# Patient Record
Sex: MALE
[De-identification: names, ages, dates, MRNs, and addresses within clinical notes are randomized per-mention and may not be internally consistent; named-entity substitution may affect disease eponyms.]

---

## 2023-10-23 ENCOUNTER — APPOINTMENT (OUTPATIENT)
Dept: ORTHOPEDIC SURGERY | Facility: CLINIC | Age: 28
End: 2023-10-23
Payer: MEDICAID

## 2023-10-23 VITALS — WEIGHT: 205.03 LBS | BODY MASS INDEX: 28.7 KG/M2 | HEIGHT: 70.87 IN

## 2023-10-23 DIAGNOSIS — M25.511 PAIN IN RIGHT SHOULDER: ICD-10-CM

## 2023-10-23 PROBLEM — Z00.00 ENCOUNTER FOR PREVENTIVE HEALTH EXAMINATION: Status: ACTIVE | Noted: 2023-10-23

## 2023-10-23 PROCEDURE — 73030 X-RAY EXAM OF SHOULDER: CPT | Mod: RT

## 2023-10-23 PROCEDURE — 20611 DRAIN/INJ JOINT/BURSA W/US: CPT | Mod: RT

## 2023-10-23 PROCEDURE — 99204 OFFICE O/P NEW MOD 45 MIN: CPT | Mod: 25

## 2024-01-22 ENCOUNTER — APPOINTMENT (OUTPATIENT)
Dept: ORTHOPEDIC SURGERY | Facility: CLINIC | Age: 29
End: 2024-01-22

## 2025-05-19 ENCOUNTER — EMERGENCY (EMERGENCY)
Facility: HOSPITAL | Age: 30
LOS: 0 days | Discharge: HOME | End: 2025-05-19
Attending: STUDENT IN AN ORGANIZED HEALTH CARE EDUCATION/TRAINING PROGRAM
Payer: MEDICAID

## 2025-05-19 VITALS
DIASTOLIC BLOOD PRESSURE: 73 MMHG | OXYGEN SATURATION: 100 % | TEMPERATURE: 98 F | HEART RATE: 84 BPM | WEIGHT: 196.87 LBS | RESPIRATION RATE: 18 BRPM | SYSTOLIC BLOOD PRESSURE: 114 MMHG

## 2025-05-19 PROCEDURE — 99283 EMERGENCY DEPT VISIT LOW MDM: CPT

## 2025-05-19 RX ORDER — ACETAMINOPHEN 500 MG/5ML
650 LIQUID (ML) ORAL ONCE
Refills: 0 | Status: COMPLETED | OUTPATIENT
Start: 2025-05-19 | End: 2025-05-19

## 2025-05-19 RX ORDER — AMOXICILLIN 500 MG/1
1 CAPSULE ORAL
Qty: 30 | Refills: 0
Start: 2025-05-19 | End: 2025-05-28

## 2025-05-19 RX ADMIN — Medication 650 MILLIGRAM(S): at 15:16

## 2025-05-19 NOTE — ED PROVIDER NOTE - NSFOLLOWUPCLINICS_GEN_ALL_ED_FT
[facility]  Dental  [address] [place] [id number]  Phone: [phone]  Fax:   Follow Up Time: 1-3 Days

## 2025-05-19 NOTE — ED PROVIDER NOTE - CLINICAL SUMMARY MEDICAL DECISION MAKING FREE TEXT BOX
30 year old male presents here c.o left upper tooth pain x 1 month which acutely worsened last night. Has been taking naproxen with minimal relief. [facility] return precautions provided.

## 2025-05-19 NOTE — ED ADULT NURSE NOTE - NSFALLUNIVINTERV_ED_ALL_ED
Bed/Stretcher in lowest position, wheels locked, appropriate side rails in place/Call bell, personal items and telephone in reach/Instruct patient to call for assistance before getting out of bed/chair/stretcher/Non-slip footwear applied when patient is off stretcher/Olds to call system/Physically safe environment - no spills, clutter or unnecessary equipment/Purposeful proactive rounding/Room/bathroom lighting operational, light cord in reach

## 2025-05-19 NOTE — ED PROVIDER NOTE - NSFOLLOWUPINSTRUCTIONS_ED_ALL_ED_FT
Our Emergency Department Referral Coordinators will be reaching out to you in the next 24-48 hours from 9:00am to 5:00pm to schedule a follow up appointment. Please expect a phone call from the hospital in that time frame. If you do not receive a call or if you have any questions or concerns, you can reach them at   [phone].    Dental Pain: What It Means  A close-up view of two teeth. One tooth is normal, and the other has tooth decay and an abscess.  Dental pain is often a sign that something is wrong with your teeth or gums. You can also have pain after a dental treatment. If you have dental pain, it's important to contact your dental care provider, especially if you don't know what's causing the pain.    Dental pain may hurt a lot or a little. It can be caused by many things, including:  Tooth decay. This is also called cavities or caries.  Infection.  An abscess. This is when the inner part of the tooth is filled with pus.  An injury or a crack in the tooth.  Gum disease or gums that move back and expose the root of a tooth.  Abnormal grinding or clenching of teeth.  Not taking good care of your teeth.  Sometimes the cause of pain isn't known.    You may have pain all the time, or it may come and go. It may happen only when you're:  Chewing.  Exposed to hot or cold temperatures.  Eating or drinking foods or drinks with a lot of sugar in them, such as soda or candy.  Follow these instructions at home:  Medicines    Take your medicines only as told.  If you were given antibiotics, take them as told. Do not stop taking them even if you start to feel better.  Eating and drinking    Avoid foods or drinks that cause you pain. These may include:  Very hot or very cold foods or drinks.  Sweet or sugary foods or drinks.  Managing pain and swelling    Bag of ice on a towel on the skin.  Use ice or an ice pack on the painful area of your face as told.  Put ice in a plastic bag.  Place a towel between your skin and the ice.  Leave the ice on for 20 minutes, 2–3 times a day.  Remove the ice if your skin turns bright red. This is very important. If you cannot feel pain, heat, or cold, you have a greater risk of damage to the area.  If your skin turns red, take off the ice right away to prevent skin damage. The risk of damage is higher if you can't feel pain, heat, or cold.  Brushing and flossing    Brush your teeth twice a day using a fluoride toothpaste.  Use a toothpaste made for sensitive teeth as told by your dental care provider.  Always use a soft toothbrush. This will help prevent irritation of your gums.  Floss your teeth at least once a day.  General instructions    Do not apply heat to the outside of your face.  To cleanse your mouth and help with any irritation and swelling in the painful area, you can try rinsing with salt water.  Swish and gargle with salt water and then spit it out. To make salt water, add a half to a whole spoonful of salt to a glass of warm water. Mix well.  You can repeat this every 2–3 hours for pain.  Contact a dental care provider if:  You have dental pain and you don't know why.  Your pain isn't controlled with medicines.  Your symptoms get worse.  You have new symptoms.  Get help right away if:  You can't open your mouth.  You're having trouble breathing or swallowing.  You have a fever.  Your face, neck, or jaw is swollen.  These symptoms may be an emergency. Call 911 right away.  Do not wait to see if the symptoms will go away.  Do not drive yourself to the hospital.  This information is not intended to replace advice given to you by your health care provider. Make sure you discuss any questions you have with your health care provider.

## 2025-05-19 NOTE — ED PROVIDER NOTE - PHYSICAL EXAMINATION
CONSTITUTIONAL: WA / WN / NAD  HEAD: NCAT  EYES: PERRL; EOMI;   ENT: Normal pharynx; mucous membranes pink/moist, no erythema.airway patent + ttp tooth #16 no apical abscess   NECK: Supple;   SKIN: Warm and dry;   NEURO: AAOx3  PSYCH: Memory Intact, Normal Affect

## 2025-05-19 NOTE — ED PROVIDER NOTE - PATIENT PORTAL LINK FT
You can access the FollowMyHealth Patient Portal offered by [facility] by registering at the following website: [email or web address]. By joining [employer]’s FollowMyHealth portal, you will also be able to view your health information using other applications (apps) compatible with our system.

## 2025-05-19 NOTE — ED PROVIDER NOTE - OBJECTIVE STATEMENT
30 year old male presents here c.o left upper tooth pain x 1 month which acutely worsened last night. Has been taking naproxen with minimal relief.

## 2025-05-21 ENCOUNTER — EMERGENCY (EMERGENCY)
Facility: HOSPITAL | Age: 30
LOS: 0 days | Discharge: HOME | End: 2025-05-21
Attending: STUDENT IN AN ORGANIZED HEALTH CARE EDUCATION/TRAINING PROGRAM
Payer: MEDICAID

## 2025-05-21 VITALS
HEART RATE: 177 BPM | TEMPERATURE: 98 F | DIASTOLIC BLOOD PRESSURE: 70 MMHG | WEIGHT: 189.6 LBS | SYSTOLIC BLOOD PRESSURE: 100 MMHG | HEIGHT: 68 IN | RESPIRATION RATE: 18 BRPM | OXYGEN SATURATION: 100 %

## 2025-05-21 VITALS — HEART RATE: 65 BPM

## 2025-05-21 PROCEDURE — 99283 EMERGENCY DEPT VISIT LOW MDM: CPT

## 2025-05-21 PROCEDURE — 99284 EMERGENCY DEPT VISIT MOD MDM: CPT

## 2025-05-21 RX ORDER — ACETAMINOPHEN 500 MG/5ML
650 LIQUID (ML) ORAL ONCE
Refills: 0 | Status: COMPLETED | OUTPATIENT
Start: 2025-05-21 | End: 2025-05-21

## 2025-05-21 RX ORDER — IBUPROFEN 200 MG
600 TABLET ORAL ONCE
Refills: 0 | Status: COMPLETED | OUTPATIENT
Start: 2025-05-21 | End: 2025-05-21

## 2025-05-21 RX ORDER — ACETAMINOPHEN 500 MG/5ML
2 LIQUID (ML) ORAL
Qty: 40 | Refills: 0
Start: 2025-05-21

## 2025-05-21 RX ORDER — IBUPROFEN 200 MG
1 TABLET ORAL
Qty: 20 | Refills: 0
Start: 2025-05-21

## 2025-05-21 RX ADMIN — Medication 600 MILLIGRAM(S): at 11:05

## 2025-05-21 RX ADMIN — Medication 650 MILLIGRAM(S): at 11:05

## 2025-05-21 NOTE — ED PROVIDER NOTE - CLINICAL SUMMARY MEDICAL DECISION MAKING FREE TEXT BOX
Pt here with pain to tooth #15, likely dental infection vs caries. Vitals wnl in ED. No airway compromise. No e/o drainable abscess or amanda angina on exam. Already on abx and will f/u with dental tomorrow. Given pain meds here and sent motrin and tylenol to pharmacy.

## 2025-05-21 NOTE — ED PROVIDER NOTE - DIFFERENTIAL DIAGNOSIS
Differential Diagnosis differential dx includes but is not limited to:  dental infection vs caries. less likely drainable abscess or amanda angina

## 2025-05-21 NOTE — ED PROVIDER NOTE - PATIENT PORTAL LINK FT
You can access the FollowMyHealth Patient Portal offered by [facility] by registering at the following website: [email or web address]. By joining [employer]’s FollowMyHealth portal, you will also be able to view your health information using other applications (apps) compatible with our system.

## 2025-05-21 NOTE — ED PROVIDER NOTE - OBJECTIVE STATEMENT
30-year-old male with no pertinent past medical history presents complaining of left upper dental pain for the past 4 days. pt denies any other symptoms including fevers, chill, headache, recent illness/travel, cough, abdominal pain, chest pain, or SOB.

## 2025-05-21 NOTE — ED PROVIDER NOTE - NSFOLLOWUPINSTRUCTIONS_ED_ALL_ED_FT
You visited the Emergency Department for a dental issue. We recommend that you follow up with a dentist. If you have a private dentist, please contact them directly. If you do not have a dentist, we have a dental clinic that will do its best to accommodate you. To make an appointment, please call the dental clinic at [phone] and leave a voicemail or email them at [email or web address] for a response.  ---------------------  Dental Pain    Dental pain (toothache) may be caused by many things including tooth decay (cavities or caries), abscess or infection, or trauma. If you were prescribed an antibiotic medicine, finish all of it even if you start to feel better. Rinsing your mouth with salt water or applying ice to the painful area of your face may help with the pain. Follow up with a dentist is important in ensuring good oral health and preventing the worsening of dental disease.    SEEK IMMEDIATE MEDICAL CARE IF YOU HAVE ANY OF THE FOLLOWING SYMPTOMS: unable to open your mouth, trouble breathing or swallowing, fever, or swelling of the face, neck, or jaw.

## 2025-05-21 NOTE — ED PROVIDER NOTE - ATTENDING APP SHARED VISIT CONTRIBUTION OF CARE
29 yo M with no PMHx who presents with L upper tooth pain x1 mo which worsened over past 3 days. Was seen in ED 2 days ago and given tylenol in ED which improved pain. He was given dental f/u and amox rx. He was called by coordinators yesterday who made dental appt for 10:30am today but when he went to dental clinic, they said they did not have an appt for him today. Pt requesting the pain meds that was given to him in the ED 2 days ago as that had helped him before. No fever, drooling, sob.    PMD Dr. Guzman    CONSTITUTIONAL: well developed, nontoxic appearing, in no acute distress, speaking in full sentences  SKIN: warm, dry, no rash  HEENT: normocephalic, no conjunctival erythema, moist mucous membranes, patent airway, tenderness to tooth #15, no gingival fluctuance, no trismus, no submandibular elevation, no drooling, no facial swelling  NECK: supple  CV:  regular rate  RESP: normal work of breathing  ABD: nondistended  MSK: moves all extremities, no cyanosis, no edema  NEURO: alert, oriented, grossly unremarkable  PSYCH: cooperative, appropriate    A&P:  Pt here with pain to tooth #15, likely dental infection vs caries. Vitals wnl in ED. No airway compromise. No e/o drainable abscess or amanda angina on exam. Already on abx and will f/u with dental tomorrow. Given pain meds here and sent motrin and tylenol to pharmacy.

## 2025-05-21 NOTE — ED PROVIDER NOTE - PHYSICAL EXAMINATION
Gen: NAD, AOx3  Head: NCAT  HEENT: TTP tooth #15 with no abscess, PERRL, oral mucosa moist, normal conjunctiva, oropharynx clear without exudate or erythema  Lung: CTAB, no respiratory distress, no wheezing, rales, rhonchi  CV: normal s1/s2, rrr, Normal perfusion, pulses 2+ throughout  MSK: No edema, no visible deformities, full range of motion in all 4 extremities  Neuro: CN II-XII grossly intact, No focal neurologic deficits  Skin: No rash   Psych: normal affect

## 2025-05-22 ENCOUNTER — OUTPATIENT (OUTPATIENT)
Dept: OUTPATIENT SERVICES | Facility: HOSPITAL | Age: 30
LOS: 1 days | End: 2025-05-22

## 2025-05-22 DIAGNOSIS — G50.1 ATYPICAL FACIAL PAIN: ICD-10-CM

## 2025-05-22 DIAGNOSIS — K02.9 DENTAL CARIES, UNSPECIFIED: ICD-10-CM

## 2025-05-22 PROBLEM — Z78.9 OTHER SPECIFIED HEALTH STATUS: Chronic | Status: ACTIVE | Noted: 2025-05-21

## 2025-05-22 PROCEDURE — D0140: CPT

## 2025-05-22 PROCEDURE — D0230: CPT

## 2025-05-22 PROCEDURE — D0220: CPT

## 2025-05-22 PROCEDURE — D0270: CPT

## 2025-05-22 PROCEDURE — T1013: CPT

## 2025-05-23 ENCOUNTER — OUTPATIENT (OUTPATIENT)
Dept: OUTPATIENT SERVICES | Facility: HOSPITAL | Age: 30
LOS: 1 days | End: 2025-05-23

## 2025-05-23 DIAGNOSIS — K02.9 DENTAL CARIES, UNSPECIFIED: ICD-10-CM

## 2025-05-23 PROCEDURE — D0140: CPT

## 2025-05-27 ENCOUNTER — OUTPATIENT (OUTPATIENT)
Dept: OUTPATIENT SERVICES | Facility: HOSPITAL | Age: 30
LOS: 1 days | End: 2025-05-27

## 2025-05-27 DIAGNOSIS — K02.52 DENTAL CARIES ON PIT AND FISSURE SURFACE PENETRATING INTO DENTIN: ICD-10-CM

## 2025-05-27 PROCEDURE — D9310: CPT

## 2025-05-27 PROCEDURE — D9944: CPT

## 2025-05-27 PROCEDURE — D7140: CPT

## 2025-05-28 DIAGNOSIS — K02.52 DENTAL CARIES ON PIT AND FISSURE SURFACE PENETRATING INTO DENTIN: ICD-10-CM

## 2025-05-28 DIAGNOSIS — K02.9 DENTAL CARIES, UNSPECIFIED: ICD-10-CM

## 2025-06-02 ENCOUNTER — OUTPATIENT (OUTPATIENT)
Dept: OUTPATIENT SERVICES | Facility: HOSPITAL | Age: 30
LOS: 1 days | End: 2025-06-02

## 2025-06-02 DIAGNOSIS — K01.1 IMPACTED TEETH: ICD-10-CM

## 2025-06-02 PROCEDURE — D0170: CPT

## 2025-06-03 DIAGNOSIS — K02.9 DENTAL CARIES, UNSPECIFIED: ICD-10-CM

## 2025-08-06 ENCOUNTER — APPOINTMENT (OUTPATIENT)
Dept: ORTHOPEDIC SURGERY | Facility: CLINIC | Age: 30
End: 2025-08-06